# Patient Record
Sex: FEMALE | Race: WHITE | NOT HISPANIC OR LATINO | Employment: FULL TIME | URBAN - NONMETROPOLITAN AREA
[De-identification: names, ages, dates, MRNs, and addresses within clinical notes are randomized per-mention and may not be internally consistent; named-entity substitution may affect disease eponyms.]

---

## 2023-12-20 ENCOUNTER — OFFICE VISIT (OUTPATIENT)
Dept: URGENT CARE | Facility: CLINIC | Age: 43
End: 2023-12-20
Payer: COMMERCIAL

## 2023-12-20 VITALS
WEIGHT: 130 LBS | OXYGEN SATURATION: 98 % | HEART RATE: 82 BPM | TEMPERATURE: 98.1 F | BODY MASS INDEX: 22.2 KG/M2 | SYSTOLIC BLOOD PRESSURE: 110 MMHG | HEIGHT: 64 IN | DIASTOLIC BLOOD PRESSURE: 70 MMHG | RESPIRATION RATE: 16 BRPM

## 2023-12-20 DIAGNOSIS — R05.1 ACUTE COUGH: Primary | ICD-10-CM

## 2023-12-20 PROCEDURE — S9088 SERVICES PROVIDED IN URGENT: HCPCS

## 2023-12-20 PROCEDURE — 99203 OFFICE O/P NEW LOW 30 MIN: CPT

## 2023-12-20 RX ORDER — ALBUTEROL SULFATE 90 UG/1
2 AEROSOL, METERED RESPIRATORY (INHALATION) EVERY 6 HOURS PRN
Qty: 8.5 G | Refills: 0 | Status: SHIPPED | OUTPATIENT
Start: 2023-12-20

## 2023-12-20 RX ORDER — PREDNISONE 10 MG/1
TABLET ORAL
Qty: 21 TABLET | Refills: 0 | Status: SHIPPED | OUTPATIENT
Start: 2023-12-20

## 2023-12-20 RX ORDER — ACETAMINOPHEN 500 MG
1000 TABLET ORAL EVERY 6 HOURS PRN
COMMUNITY

## 2023-12-20 RX ORDER — MELOXICAM 15 MG/1
15 TABLET ORAL DAILY
COMMUNITY
Start: 2023-10-06

## 2023-12-20 NOTE — PATIENT INSTRUCTIONS
Take steroid as prescribed  Use albuterol inhaler as needed for shortness of breath or wheezing  Plenty of fluids  Can use honey   Cool mist humidifier   Warm gargle with salt water for sore throat   Use Tylenol/ibuprofen as needed for fever or pain    Follow up with PCP in 3-5 days.  Proceed to  ER if symptoms worsen.

## 2023-12-20 NOTE — PROGRESS NOTES
[facility]        NAME: Amparo Walsh is a 43 y.o. female  : 1980    MRN: [id number]  DATE: 2023  TIME: 11:55 AM    Assessment and Plan   Acute cough [R05.1]  1. Acute cough  predniSONE 10 mg tablet    albuterol (ProAir HFA) 90 mcg/act inhaler            Patient Instructions     Take steroid as prescribed  Use albuterol inhaler as needed for shortness of breath or wheezing  Plenty of fluids  Can use honey   Cool mist humidifier   Warm gargle with salt water for sore throat   Use Tylenol/ibuprofen as needed for fever or pain    Follow up with PCP in 3-5 days.  Proceed to  ER if symptoms worsen.    Chief Complaint     Chief Complaint   Patient presents with    Cough     C/o persistent cough x 1 month associated with minor congestion.          History of Present Illness       Cough  This is a new problem. Episode onset: 1 month. The cough is Non-productive. Associated symptoms include wheezing (this AM). Pertinent negatives include no chest pain, chills, ear pain, fever, postnasal drip, rhinorrhea, sore throat or shortness of breath. Her past medical history is significant for asthma.   She stated she had an asthma flare up yesterday.     Review of Systems   Review of Systems   Constitutional:  Negative for chills, diaphoresis, fatigue and fever.   HENT:  Positive for congestion. Negative for ear pain, postnasal drip, rhinorrhea, sinus pressure, sore throat and trouble swallowing.    Respiratory:  Positive for cough (dry) and wheezing (this AM). Negative for chest tightness and shortness of breath.    Cardiovascular:  Negative for chest pain and palpitations.   Skin:  Negative for color change.   Neurological:  Negative for dizziness and light-headedness.   Psychiatric/Behavioral:  Negative for sleep disturbance.          Current Medications       Current Outpatient Medications:     acetaminophen (TYLENOL) 500 mg tablet, Take 1,000 mg by mouth every 6 (six) hours as needed for mild  "pain, Disp: , Rfl:     albuterol (ProAir HFA) 90 mcg/act inhaler, Inhale 2 puffs every 6 (six) hours as needed for wheezing, Disp: 8.5 g, Rfl: 0    meloxicam (MOBIC) 15 mg tablet, Take 15 mg by mouth daily, Disp: , Rfl:     predniSONE 10 mg tablet, Take six pills on day 1, take five pills on day 2, take four pills on day 3, take three pills on day 4, take two pills on day 5, take one pill on day 6, Disp: 21 tablet, Rfl: 0    Current Allergies     Allergies as of 12/20/2023 - Reviewed 12/20/2023   Allergen Reaction Noted    Hydroxychloroquine Other (See Comments) 08/03/2023    Ibuprofen GI Intolerance 08/03/2023    Nirmatrelvir-ritonavir Rash 08/03/2023    Sumatriptan Palpitations 08/03/2023            The following portions of the patient's history were reviewed and updated as appropriate: allergies, current medications, past family history, past medical history, past social history, past surgical history and problem list.     Past Medical History:   Diagnosis Date    Anxiety     Asthma     Depression     GERD (gastroesophageal reflux disease)     Hypoglycemia     Migraines     Undifferentiated connective tissue disease (HCC)        Past Surgical History:   Procedure Laterality Date    APPENDECTOMY      EXPLORATORY LAPAROTOMY      HIATAL HERNIA REPAIR      HYSTERECTOMY         Family History   Problem Relation Age of Onset    No Known Problems Mother          Medications have been verified.        Objective   /70   Pulse 82   Temp 98.1 °F (36.7 °C)   Resp 16   Ht 5' 4\" (1.626 m)   Wt 59 kg (130 lb)   LMP  (Exact Date)   SpO2 98%   BMI 22.31 kg/m²        Physical Exam     Physical Exam  Constitutional:       General: She is not in acute distress.     Appearance: Normal appearance. She is not ill-appearing.   HENT:      Head: Normocephalic.      Right Ear: Tympanic membrane and external ear normal.      Left Ear: Tympanic membrane and external ear normal.      Nose: No congestion.      Mouth/Throat:      " Mouth: Mucous membranes are moist.      Pharynx: Oropharynx is clear.   Cardiovascular:      Rate and Rhythm: Normal rate and regular rhythm.      Pulses: Normal pulses.      Heart sounds: Normal heart sounds.   Pulmonary:      Effort: Pulmonary effort is normal. No respiratory distress.      Breath sounds: Normal breath sounds. No stridor. No wheezing, rhonchi or rales.   Lymphadenopathy:      Cervical: No cervical adenopathy.   Skin:     General: Skin is warm and dry.   Neurological:      General: No focal deficit present.      Mental Status: She is alert and oriented to person, place, and time. Mental status is at baseline.   Psychiatric:         Mood and Affect: Mood normal.         Behavior: Behavior normal.         Thought Content: Thought content normal.         Judgment: Judgment normal.

## 2025-06-13 DIAGNOSIS — Z00.6 ENCOUNTER FOR EXAMINATION FOR NORMAL COMPARISON OR CONTROL IN CLINICAL RESEARCH PROGRAM: ICD-10-CM

## 2025-06-16 ENCOUNTER — APPOINTMENT (OUTPATIENT)
Dept: LAB | Facility: CLINIC | Age: 45
End: 2025-06-16

## 2025-06-16 DIAGNOSIS — Z00.6 ENCOUNTER FOR EXAMINATION FOR NORMAL COMPARISON OR CONTROL IN CLINICAL RESEARCH PROGRAM: ICD-10-CM

## 2025-06-16 PROCEDURE — 36415 COLL VENOUS BLD VENIPUNCTURE: CPT

## 2025-06-27 LAB
APOB+LDLR+PCSK9 GENE MUT ANL BLD/T: NOT DETECTED
BRCA1+BRCA2 DEL+DUP + FULL MUT ANL BLD/T: NOT DETECTED
MLH1+MSH2+MSH6+PMS2 GN DEL+DUP+FUL M: NOT DETECTED

## 2025-07-07 ENCOUNTER — OFFICE VISIT (OUTPATIENT)
Dept: FAMILY MEDICINE CLINIC | Facility: CLINIC | Age: 45
End: 2025-07-07
Payer: COMMERCIAL

## 2025-07-07 ENCOUNTER — APPOINTMENT (OUTPATIENT)
Dept: LAB | Facility: CLINIC | Age: 45
End: 2025-07-07
Payer: COMMERCIAL

## 2025-07-07 VITALS
DIASTOLIC BLOOD PRESSURE: 55 MMHG | WEIGHT: 131.39 LBS | HEART RATE: 65 BPM | HEIGHT: 64 IN | BODY MASS INDEX: 22.43 KG/M2 | OXYGEN SATURATION: 97 % | SYSTOLIC BLOOD PRESSURE: 106 MMHG

## 2025-07-07 DIAGNOSIS — G43.709 CHRONIC MIGRAINE WITHOUT AURA WITHOUT STATUS MIGRAINOSUS, NOT INTRACTABLE: ICD-10-CM

## 2025-07-07 DIAGNOSIS — K58.1 IRRITABLE BOWEL SYNDROME WITH CONSTIPATION: ICD-10-CM

## 2025-07-07 DIAGNOSIS — F43.10 PTSD (POST-TRAUMATIC STRESS DISORDER): ICD-10-CM

## 2025-07-07 DIAGNOSIS — N30.10 INTERSTITIAL CYSTITIS: Primary | ICD-10-CM

## 2025-07-07 DIAGNOSIS — M79.7 FIBROMYALGIA: ICD-10-CM

## 2025-07-07 DIAGNOSIS — M35.1 MIXED CONNECTIVE TISSUE DISEASE (HCC): ICD-10-CM

## 2025-07-07 DIAGNOSIS — Z13.6 SCREENING FOR CARDIOVASCULAR CONDITION: ICD-10-CM

## 2025-07-07 DIAGNOSIS — F32.A ANXIETY AND DEPRESSION: ICD-10-CM

## 2025-07-07 DIAGNOSIS — F41.9 ANXIETY AND DEPRESSION: ICD-10-CM

## 2025-07-07 DIAGNOSIS — N80.9 ENDOMETRIOSIS: ICD-10-CM

## 2025-07-07 DIAGNOSIS — Z11.4 SCREENING FOR HIV (HUMAN IMMUNODEFICIENCY VIRUS): ICD-10-CM

## 2025-07-07 DIAGNOSIS — Z11.59 NEED FOR HEPATITIS C SCREENING TEST: ICD-10-CM

## 2025-07-07 DIAGNOSIS — K21.9 GASTROESOPHAGEAL REFLUX DISEASE, UNSPECIFIED WHETHER ESOPHAGITIS PRESENT: ICD-10-CM

## 2025-07-07 LAB
CHOLEST SERPL-MCNC: 148 MG/DL (ref ?–200)
CRP SERPL QL: 6.8 MG/L
ERYTHROCYTE [SEDIMENTATION RATE] IN BLOOD: 5 MM/HOUR (ref 0–19)
HCV AB SER QL: NORMAL
HDLC SERPL-MCNC: 77 MG/DL
HIV 1+2 AB+HIV1 P24 AG SERPL QL IA: NORMAL
LDLC SERPL CALC-MCNC: 59 MG/DL (ref 0–100)
NONHDLC SERPL-MCNC: 71 MG/DL
TRIGL SERPL-MCNC: 62 MG/DL (ref ?–150)

## 2025-07-07 PROCEDURE — 99204 OFFICE O/P NEW MOD 45 MIN: CPT | Performed by: FAMILY MEDICINE

## 2025-07-07 PROCEDURE — 87389 HIV-1 AG W/HIV-1&-2 AB AG IA: CPT

## 2025-07-07 PROCEDURE — 86803 HEPATITIS C AB TEST: CPT

## 2025-07-07 PROCEDURE — 80061 LIPID PANEL: CPT

## 2025-07-07 PROCEDURE — 86225 DNA ANTIBODY NATIVE: CPT

## 2025-07-07 PROCEDURE — 36415 COLL VENOUS BLD VENIPUNCTURE: CPT

## 2025-07-07 PROCEDURE — 86038 ANTINUCLEAR ANTIBODIES: CPT

## 2025-07-07 PROCEDURE — 85652 RBC SED RATE AUTOMATED: CPT

## 2025-07-07 PROCEDURE — 86140 C-REACTIVE PROTEIN: CPT

## 2025-07-07 RX ORDER — PREGABALIN 75 MG/1
75 CAPSULE ORAL 3 TIMES DAILY
Qty: 90 CAPSULE | Refills: 2 | Status: SHIPPED | OUTPATIENT
Start: 2025-07-07

## 2025-07-07 NOTE — PROGRESS NOTES
Name: Amparo Walsh      : 1980      MRN: [id number]  Encounter Provider: Kisha Dawson DO  Encounter Date: 2025   Encounter department: [facility]  :  Assessment & Plan  Interstitial cystitis  Chronic  Follows with urology closely, management per specialist       Mixed connective tissue disease (HCC)  Undifferentiated, need to obtain records from prior rheumatologist in [place]  Obtain labs as below  Refer for patient to establish with rheumatology in this area, requested [facility]  Orders:    RAFA Screen w/Reflex [place]; Future    Sedimentation rate, automated; Future    C-reactive protein; Future    Ambulatory Referral to Rheumatology; Future    Chronic migraine without aura without status migrainosus, not intractable  Chronic, stable  Was previously following with neurology in [place], referred to establish with [facility]  Continue to monitor    Orders:    Ambulatory Referral to Neurology; Future    Endometriosis  Chronic, stable  Follows with OB/GYN at [facility]       Screening for cardiovascular condition    Orders:    Lipid panel; Future    Irritable bowel syndrome with constipation  Chronic, stable  Was previously following with gastroenterology in [place], referred to establish with [facility]  Continue to monitor  Orders:    Ambulatory Referral to Gastroenterology; Future    Gastroesophageal reflux disease, unspecified whether esophagitis present  Chronic, stable  Was previously following with gastroenterology in [place], referred to establish with [facility]  Continue to monitor       Anxiety and depression  Chronic, not well-controlled at this time  History of PTSD (SA as a child)  Previously was following with psychiatry services in [place], referred to establish with [facility]  Continue to monitor    Orders:    Ambulatory referral to Psych Services; Future    PTSD (post-traumatic stress  disorder)  See above under anxiety and depression  Orders:    Ambulatory referral to Psych Services; Future    Need for hepatitis C screening test    Orders:    Hepatitis C Antibody; Future    Screening for HIV (human immunodeficiency virus)    Orders:    HIV 1/2 AG/AB w Reflex [facility] for 2 yr old and above; Future    Fibromyalgia  Patient has previously failed gabapentin, and was stable on Lyrica, however has been out of medication for 2 years since moving here from [place]  Will restart on a lower dose of Lyrica at this time  Follow-up in 8 weeks to reassess    Orders:    pregabalin (LYRICA) 75 mg capsule; Take 1 capsule (75 mg total) by mouth 3 (three) times a day      Return in 8 weeks (on 9/1/2025) for Annual physical, follow up.       History of Present Illness   Chief Complaint   Patient presents with    New Patient Visit     I need a primary care provider that is knowledgeable in complex medical history   cases , my current pcp isn't very knowledgeable and doesn't seem to want to help   me        Prior PCP in [place] was Dr. Spivey   Prior to that patient received in care in [place]   Moved to [place] in 4/2023   PMH: Interstitial cystitis (history of bladder and uterine prolapse s/p hysterectomy and sling), history of ruptured ovarian cyst s/p unilateral oophorectomy, migraines, unidentified AI condition (mixed connective tissue disease, undifferentiated), IBS-C, GERD, hypoglycemia, endometriosis, anxiety, depression, PTSD   SurgHx - see above, hernia repair, appendectomy 2011, exlap    Allergies: reviewed in chart and up to date   Medications: None currently   SocialHx:   Tobacco> none   Alcohol> none   Relationship> in a relationship, has one daughter   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      HPI  Review of Systems   Constitutional:  Negative for appetite change, chills, diaphoresis, fever and unexpected weight change.   Eyes:  Negative for visual disturbance.   Respiratory:  Negative for shortness of breath.    Cardiovascular:  Negative for chest pain and leg swelling.   Gastrointestinal:  Negative for constipation and diarrhea.   Endocrine: Negative for polydipsia and polyuria.   Genitourinary:  Negative for frequency.   Musculoskeletal:  Positive for arthralgias and myalgias.   Neurological:  Positive for headaches. Negative for dizziness and light-headedness.       Objective   /55 (BP Location: Right arm, Patient Position: Sitting, Cuff Size: Standard)   Pulse 65   Ht 5' 4\" (1.626 m)   Wt 59.6 kg (131 lb 6.3 oz)   SpO2 97%   BMI 22.55 kg/m²      Physical Exam  Vitals reviewed.   Constitutional:       General: She is not in acute distress.     Appearance: She is normal weight. She is not ill-appearing.   HENT:      Head: Normocephalic and atraumatic.     Eyes:      Extraocular Movements: Extraocular movements intact.      Conjunctiva/sclera: Conjunctivae normal.       Cardiovascular:      Rate and Rhythm: Normal rate and regular rhythm.      Heart sounds: Normal heart sounds.   Pulmonary:      Effort: Pulmonary effort is normal.      Breath sounds: Normal breath sounds.   Abdominal:      General: Abdomen is flat.      Palpations: Abdomen is soft.     Musculoskeletal:      Right lower leg: No edema.      Left lower leg: No edema.     Neurological:      General: No focal deficit present.      Mental Status: She is alert.     Psychiatric:         Mood and Affect: Mood normal.         Behavior: Behavior normal.         "

## 2025-07-07 NOTE — ASSESSMENT & PLAN NOTE
Chronic  Follows with urology closely, management per specialist       
Chronic, not well-controlled at this time  History of PTSD (SA as a child)  Previously was following with psychiatry services in [place], referred to establish with [facility] in [place]  Continue to monitor    Orders:    Ambulatory referral to Psych Services; Future    
Chronic, stable  Follows with OB[facility]GYN at [facility]       
Chronic, stable  Was previously following with gastroenterology in [place], referred to establish with [facility] in [place]  Continue to monitor  Orders:    Ambulatory Referral to Gastroenterology; Future    
Chronic, stable  Was previously following with gastroenterology in [place], referred to establish with [facility] in [place]  Continue to monitor       
Chronic, stable  Was previously following with neurology in [place], referred to establish with [facility] in [place]  Continue to monitor    Orders:    Ambulatory Referral to Neurology; Future    
Patient has previously failed gabapentin, and was stable on Lyrica, however has been out of medication for 2 years since moving here from [place]  Will restart on a lower dose of Lyrica at this time  Follow-up in 8 weeks to reassess    Orders:    pregabalin (LYRICA) 75 mg capsule; Take 1 capsule (75 mg total) by mouth 3 (three) times a day    
See above under anxiety and depression  Orders:    Ambulatory referral to Psych Services; Future    
Undifferentiated, need to obtain records from prior rheumatologist in [place]  Obtain labs as below  Refer for patient to establish with rheumatology in this area, requested [facility] in [place]  Orders:    RAFA Screen w/Reflex [facility]; Future    Sedimentation rate, automated; Future    C-reactive protein; Future    Ambulatory Referral to Rheumatology; Future    
01-Dec-2018 10:57

## 2025-07-08 ENCOUNTER — TELEPHONE (OUTPATIENT)
Age: 45
End: 2025-07-08

## 2025-07-14 LAB
DSDNA IGG SERPL IA-ACNC: <0.9 IU/ML (ref ?–15)
NUCLEAR IGG SER IA-RTO: 0.3 RATIO (ref ?–1)